# Patient Record
Sex: MALE | Race: WHITE | NOT HISPANIC OR LATINO | Employment: OTHER | URBAN - METROPOLITAN AREA
[De-identification: names, ages, dates, MRNs, and addresses within clinical notes are randomized per-mention and may not be internally consistent; named-entity substitution may affect disease eponyms.]

---

## 2018-03-01 ENCOUNTER — OFFICE VISIT CONVERTED (OUTPATIENT)
Dept: CARDIOLOGY | Facility: CLINIC | Age: 73
End: 2018-03-01
Attending: INTERNAL MEDICINE

## 2018-03-01 ENCOUNTER — CONVERSION ENCOUNTER (OUTPATIENT)
Dept: CARDIOLOGY | Facility: CLINIC | Age: 73
End: 2018-03-01

## 2018-09-06 ENCOUNTER — CONVERSION ENCOUNTER (OUTPATIENT)
Dept: CARDIOLOGY | Facility: CLINIC | Age: 73
End: 2018-09-06

## 2018-09-06 ENCOUNTER — OFFICE VISIT CONVERTED (OUTPATIENT)
Dept: CARDIOLOGY | Facility: CLINIC | Age: 73
End: 2018-09-06
Attending: INTERNAL MEDICINE

## 2019-04-25 ENCOUNTER — OFFICE VISIT CONVERTED (OUTPATIENT)
Dept: CARDIOLOGY | Facility: CLINIC | Age: 74
End: 2019-04-25
Attending: INTERNAL MEDICINE

## 2019-11-06 ENCOUNTER — OFFICE VISIT CONVERTED (OUTPATIENT)
Dept: CARDIOLOGY | Facility: CLINIC | Age: 74
End: 2019-11-06
Attending: INTERNAL MEDICINE

## 2019-11-06 ENCOUNTER — CONVERSION ENCOUNTER (OUTPATIENT)
Dept: CARDIOLOGY | Facility: CLINIC | Age: 74
End: 2019-11-06

## 2020-05-21 ENCOUNTER — TELEMEDICINE CONVERTED (OUTPATIENT)
Dept: CARDIOLOGY | Facility: CLINIC | Age: 75
End: 2020-05-21
Attending: INTERNAL MEDICINE

## 2020-12-09 ENCOUNTER — TELEPHONE CONVERTED (OUTPATIENT)
Dept: CARDIOLOGY | Facility: CLINIC | Age: 75
End: 2020-12-09
Attending: INTERNAL MEDICINE

## 2021-05-13 NOTE — PROGRESS NOTES
Quick Note      Patient Name: Amador Santana   Patient ID: [id number]   Sex: Male   YOB: 1945    Primary Care Provider: Hailey BRODERICK   Referring Provider: Hailey BRODERICK    Visit Date: May 21, 2020    Provider: Mundo Mandujano MD   Location: [facility]   Location Address: [address] [place] [address] A   [place]  [id number]   Location Phone: [phone]          History Of Present Illness  TELEHEALTH TELEPHONE VISIT  Chief Complaint: Coronary artery disease   Amador Santana is a 75-year-old male who is presenting for evaluation via telehealth telephone visit. Telehealth visit performed due to COVID-19. Verbal consent obtained before beginning visit. The patient has known coronary artery disease, previous CABG, diabetes, hypertension, paroxysmal atrial fibrillation that occurred after surgery, and aortic aneurysm. The patient has been doing well. He denies any new complaints or problems, has chronic bilateral lower extremity edema.   Provider spent 5 minutes with the patient during telehealth visit.   The following staff were present during this visit: Provider only.   Past Medical History/Overview of Patient Symptoms     PAST MEDICAL HISTORY:  Positive for coronary artery disease, previous CABG; paroxysmal atrial fibrillation; diabetes mellitus; hypertension; dyslipidemia; aortic aneurysm measuring 4.5 cm.      FAMILY HISTORY:  Positive for diabetes, hypertension, and heart disease.      PSYCHOSOCIAL HISTORY:   No history of mood change or depression. He does not drink alcohol and does not use tobacco.      CURRENT MEDICATIONS:  Lisinopril/HCTZ 20/12.5 mg b.i.d.; Metformin 1000 mg b.i.d.; Coreg 50 mg b.i.d.; Atorvastatin 80 mg daily; Glipizide 10 mg daily; Amlodipine 10 mg daily; Spironolactone 25 mg daily; Toujeo 15 mL daily; aspirin 81 mg daily; Loratadine 10 mg daily.  The dosage and frequency of the medications were reviewed with the patient.     REVIEW OF  "SYSTEMS:  Positive for swelling (feet). Negative for chest pain, palpitations, shortness of breath, chronic or frequent coughs, asthma or wheezing.       Vitals     Per patient, at-home vitals:  Blood pressure 130/87, heart rate 76, weight 321 pounds, height 5'11\".         Physical Examination     Laboratory:  Creatinine 1.8, LDL 65.           Assessment     ASSESSMENT AND PLAN:  1.  Coronary artery disease with previous CABG.  No angina.  On chronic aspirin 81 mg once a day.   2.  Hyperlipidemia, on statin.  LDL at goal.   3.  Hypertension, controlled.   4.  Aortic aneurysm. Plan on getting MRI due to chronic renal failure issues.         MD ALEJANDRA Copeland/debbie    This note was transcribed by Wandy Cates.  edbbie/alejandra  The above service was transcribed by Wandy Cates, and I attest to the accuracy of the note.  ALEJANDRA             Electronically Signed by: Katja Cates-, Other -Author on June 1, 2020 02:55:33 PM  Electronically Co-signed by: Mundo Mandujano MD -Reviewer on June 6, 2020 02:06:56 PM  "

## 2021-05-13 NOTE — PROGRESS NOTES
Progress Note      Patient Name: Amador Santana   Patient ID: [id number]   Sex: Male   YOB: 1945    Primary Care Provider: Hailey BRODERICK   Referring Provider: Hailey BRODERICK    Visit Date: December 9, 2020    Provider: Mundo Mandujano MD   Location: [facility]   Location Address: [address] [place] [address], [facility] A   [place]  [id number]   Location Phone: [phone]          Chief Complaint     Coronary artery disease.       History Of Present Illness  TELEHEALTH TELEPHONE VISIT  Amador Santana is a 75 year old /White male who is presenting for evaluation via telehealth telephone visit. Verbal consent obtained before beginning visit. The patient has CAD with CABG, diabetes, hypertension, and paroxysmal A-fib that occurred after surgery. He has been doing well. He denies chest pain, shortness of breath, and myalgias.   Provider spent 10 minutes with the patient during the telehealth visit.   The following staff were present during this visit: Provider only.   Past Medical History/ Overview of Patient Symptoms     CURRENT MEDICATIONS: Toujeo 40 units daily; Trulicity injection weekly; lisinopril-HCTZ 20-12.5 b.i.d.; Coreg 50 mg b.i.d.; atorvastatin 80 mg daily; glipizide 10 mg daily; amlodipine 10 mg daily; spironolactone 25 mg daily; aspirin 81 mg daily; loratadine 10 mg daily.     PAST MEDICAL HISTORY: Coronary artery disease, previous CABG; paroxysmal atrial fibrillation; diabetes mellitus; hypertension; dyslipidemia; aortic aneurysm measuring 4.5 cm.    ALLERGIES: No known drug allergies.       Vitals     Per patient, at-home vitals:  Blood pressure 128/78.  Heart rate 71.       Physical Examination     Laboratory: Hemoglobin 13.1.  Creatinine 1.6.  LDL 72.  ALT44.  AST 26.           Assessment     ASSESSMENT AND PLAN:  1.  Coronary artery disease, previous CABG.  No angina.  On chronic aspirin 81 mg once a day.   2.  Hyperlipidemia.  He is on a statin.  LDL nearly  below 70.  Continue with his current dosing.   3.  Hypertension.  Controlled.       Plan  · Instructions  o Plan Of Care:             Electronically Signed by: Inessa Stephens-, Other -Author on December 14, 2020 03:20:11 PM  Electronically Co-signed by: Mundo Mandujano MD -Reviewer on December 14, 2020 04:21:14 PM

## 2021-05-15 VITALS
WEIGHT: 315 LBS | DIASTOLIC BLOOD PRESSURE: 100 MMHG | BODY MASS INDEX: 45.1 KG/M2 | HEIGHT: 70 IN | SYSTOLIC BLOOD PRESSURE: 162 MMHG | HEART RATE: 64 BPM

## 2021-05-15 VITALS
WEIGHT: 315 LBS | HEART RATE: 60 BPM | BODY MASS INDEX: 45.1 KG/M2 | HEIGHT: 70 IN | DIASTOLIC BLOOD PRESSURE: 86 MMHG | SYSTOLIC BLOOD PRESSURE: 146 MMHG

## 2021-05-16 VITALS
HEIGHT: 70 IN | BODY MASS INDEX: 45.1 KG/M2 | WEIGHT: 315 LBS | HEART RATE: 64 BPM | DIASTOLIC BLOOD PRESSURE: 68 MMHG | SYSTOLIC BLOOD PRESSURE: 102 MMHG

## 2021-05-16 VITALS
DIASTOLIC BLOOD PRESSURE: 78 MMHG | SYSTOLIC BLOOD PRESSURE: 118 MMHG | HEART RATE: 64 BPM | HEIGHT: 70 IN | WEIGHT: 315 LBS | BODY MASS INDEX: 45.1 KG/M2

## 2021-07-28 PROBLEM — E78.5 HYPERLIPIDEMIA LDL GOAL <70: Status: ACTIVE | Noted: 2021-07-28

## 2021-07-28 PROBLEM — I10 ESSENTIAL HYPERTENSION: Status: ACTIVE | Noted: 2021-07-28

## 2021-07-28 PROBLEM — I71.20 THORACIC AORTIC ANEURYSM WITHOUT RUPTURE (HCC): Status: ACTIVE | Noted: 2021-07-28

## 2021-07-28 PROBLEM — I48.0 PAROXYSMAL ATRIAL FIBRILLATION (HCC): Status: ACTIVE | Noted: 2021-07-28

## 2021-07-28 PROBLEM — I25.10 CORONARY ARTERY DISEASE INVOLVING NATIVE CORONARY ARTERY OF NATIVE HEART WITHOUT ANGINA PECTORIS: Status: ACTIVE | Noted: 2021-07-28

## 2021-07-28 NOTE — PROGRESS NOTES
Chief Complaint  Coronary Artery Disease, Previous CABG, Hypertension, and Atrial Fibrillation    Subjective    With increasing lower extremity edema over the summer.  10 pound weight gain since his last visit the patient has not had any increasing shortness of breath PND orthopnea symptoms.  Patient is noticed an increase in edema symptoms since darted on Lasix about 3 months ago    Past Medical History:   Diagnosis Date   • Acute renal failure superimposed on stage 3b chronic kidney disease (CMS/[facility]) 7/29/2021   • Aneurysm (CMS/[facility])    • CAD with CABG 7/28/2021   • Chronic kidney disease    • Diabetes mellitus (CMS/[facility])    • Essential hypertension 7/28/2021   • Hyperlipidemia    • Paroxysmal atrial fibrillation (CMS/[facility]) 7/28/2021      Positive for coronary artery disease, previous CABG, paroxysmal atrial fibrillation, diabetes, hypertension, dyslipidemia and aortic aneurysm measuring 4.5 cm.       Current Outpatient Medications:   •  amLODIPine (NORVASC) 10 MG tablet, Take 10 mg by mouth Daily., Disp: , Rfl:   •  atorvastatin (LIPITOR) 80 MG tablet, Take 80 mg by mouth every night at bedtime., Disp: , Rfl:   •  carvedilol (COREG) 25 MG tablet, Take 50 mg by mouth 2 (Two) Times a Day., Disp: , Rfl:   •  furosemide (LASIX) 20 MG tablet, , Disp: , Rfl:   •  glipizide (GLUCOTROL) 10 MG tablet, Take 10 mg by mouth 2 (Two) Times a Day., Disp: , Rfl:   •  lisinopril-hydrochlorothiazide (PRINZIDE,ZESTORETIC) 20-12.5 MG per tablet, Take 2 tablets by mouth Daily., Disp: , Rfl:   •  loratadine (CLARITIN) 10 MG tablet, Take 10 mg by mouth Daily., Disp: , Rfl:   •  spironolactone (ALDACTONE) 25 MG tablet, Take 25 mg by mouth Daily., Disp: , Rfl:   •  Toujeo SoloStar 300 UNIT/ML solution pen-injector injection, 60 Units., Disp: , Rfl:   •  Trulicity 1.5 MG/0.5ML solution pen-injector, INJECT 0.5ML (1.5MG) SUBCUTANEOUSLY EVERY 7 DAYS IN THE ABDOMEN THIGH OR UPPER ARM ROTATING INJECTION SITES, Disp: , Rfl:     There are no  "discontinued medications.  No Known Allergies     Social History     Tobacco Use   • Smoking status: Never Smoker   • Smokeless tobacco: Never Used   Vaping Use   • Vaping Use: Never used   Substance Use Topics   • Alcohol use: Never   • Drug use: Never       Family History   Problem Relation Age of Onset   • No Known Problems Mother    • Heart attack Father    • Heart failure Father    • Hypertension Father         Objective     /70   Pulse 68   Ht 177.8 cm (70\")   Wt (!) 152 kg (336 lb)   BMI 48.21 kg/m²       Physical Exam    General Appearance:   · no acute distress  · Alert and oriented x3  HENT:   · lips not cyanotic  · Atraumatic  Neck:  · No jvd   · supple  Respiratory:  · no respiratory distress  · normal breath sounds  · no rales  Cardiovascular:  · Regular irregular   · no S3, no S4   · no murmur  · no rub  Extremities  · No cyanosis  · lower extremity edema: +1-2  Skin:   · warm, dry  · No rashes      Result Review :     No results found for: PROBNP       No results found for: TSH   No results found for: FREET4   No results found for: DDIMERQUANT  No results found for: MG   No results found for: DIGOXIN   No results found for: TROPONINT          No results found for: POCTROP  Labs 7/21   Hemoglobin was 12.5  Hemoglobin A1c was 10.7  Creatinine was 2.0  Potassium was 5.3  Magnesium was 2.1              Diagnoses and all orders for this visit:    1. Acute on chronic heart failure with preserved ejection fraction (CMS/HCC) (Primary)  Assessment & Plan:  Patient with evidence of increasing volume overload and lower extremity edema suspect underlying diastolic congestive heart failure as a contributing cause.  Improved with Lasix 20 a day for the patient to keep a home weight log and counseled on low-sodium diet.  Will check echocardiogram and proBNP level as well.  Also strongly urged him to get work-up for obstructive sleep apnea      2. CAD with CABG    3. Paroxysmal atrial fibrillation " (CMS/[facility])    4. Essential hypertension  Assessment & Plan:  Controlled continue with current dose of lisinopril hydrochlorothiazide, amlodipine, and Coreg      5. Thoracic aortic aneurysm without rupture (CMS/HCC)    6. Hyperlipidemia LDL goal <70    7. Dyspnea on exertion  -     Adult Transthoracic Echo Complete W/ Cont if Necessary Per Protocol; Future  -     BNP; Future    8. Morbid obesity with BMI of 45.0-49.9, adult (CMS/HCC)    9. Acute renal failure superimposed on stage 3b chronic kidney disease, unspecified acute renal failure type (CMS/[facility])          Follow Up     Return in about 6 months (around 1/29/2022).    Patient was given instructions and counseling regarding his condition or for health maintenance advice. Please see specific information pulled into the AVS if appropriate.

## 2021-07-29 ENCOUNTER — OFFICE VISIT (OUTPATIENT)
Dept: CARDIOLOGY | Facility: CLINIC | Age: 76
End: 2021-07-29

## 2021-07-29 VITALS
DIASTOLIC BLOOD PRESSURE: 70 MMHG | SYSTOLIC BLOOD PRESSURE: 128 MMHG | HEIGHT: 70 IN | WEIGHT: 315 LBS | BODY MASS INDEX: 45.1 KG/M2 | HEART RATE: 68 BPM

## 2021-07-29 DIAGNOSIS — R06.09 DYSPNEA ON EXERTION: ICD-10-CM

## 2021-07-29 DIAGNOSIS — I50.33 ACUTE ON CHRONIC HEART FAILURE WITH PRESERVED EJECTION FRACTION (HCC): Primary | ICD-10-CM

## 2021-07-29 DIAGNOSIS — N17.9 ACUTE RENAL FAILURE SUPERIMPOSED ON STAGE 3B CHRONIC KIDNEY DISEASE, UNSPECIFIED ACUTE RENAL FAILURE TYPE (HCC): ICD-10-CM

## 2021-07-29 DIAGNOSIS — I25.10 CORONARY ARTERY DISEASE INVOLVING NATIVE CORONARY ARTERY OF NATIVE HEART WITHOUT ANGINA PECTORIS: ICD-10-CM

## 2021-07-29 DIAGNOSIS — I10 ESSENTIAL HYPERTENSION: ICD-10-CM

## 2021-07-29 DIAGNOSIS — E78.5 HYPERLIPIDEMIA LDL GOAL <70: ICD-10-CM

## 2021-07-29 DIAGNOSIS — E66.01 MORBID OBESITY WITH BMI OF 45.0-49.9, ADULT (HCC): ICD-10-CM

## 2021-07-29 DIAGNOSIS — I71.20 THORACIC AORTIC ANEURYSM WITHOUT RUPTURE (HCC): ICD-10-CM

## 2021-07-29 DIAGNOSIS — N18.32 ACUTE RENAL FAILURE SUPERIMPOSED ON STAGE 3B CHRONIC KIDNEY DISEASE, UNSPECIFIED ACUTE RENAL FAILURE TYPE (HCC): ICD-10-CM

## 2021-07-29 DIAGNOSIS — I48.0 PAROXYSMAL ATRIAL FIBRILLATION (HCC): ICD-10-CM

## 2021-07-29 PROCEDURE — 99214 OFFICE O/P EST MOD 30 MIN: CPT | Performed by: INTERNAL MEDICINE

## 2021-07-29 RX ORDER — LISINOPRIL AND HYDROCHLOROTHIAZIDE 20; 12.5 MG/1; MG/1
2 TABLET ORAL DAILY
COMMUNITY
Start: 2021-07-22

## 2021-07-29 RX ORDER — ATORVASTATIN CALCIUM 80 MG/1
80 TABLET, FILM COATED ORAL
COMMUNITY
Start: 2021-07-22

## 2021-07-29 RX ORDER — CARVEDILOL 25 MG/1
50 TABLET ORAL 2 TIMES DAILY
COMMUNITY
Start: 2021-07-22

## 2021-07-29 RX ORDER — AMLODIPINE BESYLATE 10 MG/1
10 TABLET ORAL DAILY
COMMUNITY
Start: 2021-07-22 | End: 2022-09-14

## 2021-07-29 RX ORDER — SPIRONOLACTONE 25 MG/1
25 TABLET ORAL DAILY
COMMUNITY
Start: 2021-07-22

## 2021-07-29 RX ORDER — INSULIN GLARGINE 300 U/ML
60 INJECTION, SOLUTION SUBCUTANEOUS
COMMUNITY
Start: 2021-07-19

## 2021-07-29 RX ORDER — LORATADINE 10 MG/1
10 TABLET ORAL DAILY
COMMUNITY
Start: 2021-07-22

## 2021-07-29 RX ORDER — GLIPIZIDE 10 MG/1
10 TABLET ORAL 2 TIMES DAILY
COMMUNITY
Start: 2021-07-22

## 2021-07-29 RX ORDER — FUROSEMIDE 20 MG/1
40 TABLET ORAL DAILY
COMMUNITY
Start: 2021-05-20

## 2021-07-29 RX ORDER — DULAGLUTIDE 1.5 MG/.5ML
INJECTION, SOLUTION SUBCUTANEOUS
COMMUNITY
Start: 2021-07-19

## 2021-07-29 NOTE — PATIENT INSTRUCTIONS
"Low-Sodium Eating Plan  Sodium, which is an element that makes up salt, helps you maintain a healthy balance of fluids in your body. Too much sodium can increase your blood pressure and cause fluid and waste to be held in your body.  Your health care provider or dietitian may recommend following this plan if you have high blood pressure (hypertension), kidney disease, liver disease, or heart failure. Eating less sodium can help lower your blood pressure, reduce swelling, and protect your heart, liver, and kidneys.  What are tips for following this plan?  Reading food labels  · The Nutrition Facts label lists the amount of sodium in one serving of the food. If you eat more than one serving, you must multiply the listed amount of sodium by the number of servings.  · Choose foods with less than 140 mg of sodium per serving.  · Avoid foods with 300 mg of sodium or more per serving.  Shopping    · Look for lower-sodium products, often labeled as \"low-sodium\" or \"no salt added.\"  · Always check the sodium content, even if foods are labeled as \"unsalted\" or \"no salt added.\"  · Buy fresh foods.  ? Avoid canned foods and pre-made or frozen meals.  ? Avoid canned, cured, or processed meats.  · Buy breads that have less than 80 mg of sodium per slice.  Cooking    · Eat more home-cooked food and less restaurant, buffet, and fast food.  · Avoid adding salt when cooking. Use salt-free seasonings or herbs instead of table salt or sea salt. Check with your health care provider or pharmacist before using salt substitutes.  · Cook with plant-based oils, such as canola, sunflower, or olive oil.  Meal planning  · When eating at a restaurant, ask that your food be prepared with less salt or no salt, if possible. Avoid dishes labeled as brined, pickled, cured, smoked, or made with soy sauce, miso, or teriyaki sauce.  · Avoid foods that contain MSG (monosodium glutamate). MSG is sometimes added to Chinese food, bouillon, and some canned " "foods.  · Make meals that can be grilled, baked, poached, roasted, or steamed. These are generally made with less sodium.  General information  Most people on this plan should limit their sodium intake to 1,500-2,000 mg (milligrams) of sodium each day.  What foods should I eat?  Fruits  Fresh, frozen, or canned fruit. Fruit juice.  Vegetables  Fresh or frozen vegetables. \"No salt added\" canned vegetables. \"No salt added\" tomato sauce and paste. Low-sodium or reduced-sodium tomato and vegetable juice.  Grains  Low-sodium cereals, including oats, puffed wheat and rice, and shredded wheat. Low-sodium crackers. Unsalted rice. Unsalted pasta. Low-sodium bread. Whole-grain breads and whole-grain pasta.  Meats and other proteins  Fresh or frozen (no salt added) meat, poultry, seafood, and fish. Low-sodium canned tuna and salmon. Unsalted nuts. Dried peas, beans, and lentils without added salt. Unsalted canned beans. Eggs. Unsalted nut butters.  Dairy  Milk. Soy milk. Cheese that is naturally low in sodium, such as ricotta cheese, fresh mozzarella, or Swiss cheese. Low-sodium or reduced-sodium cheese. Cream cheese. Yogurt.  Seasonings and condiments  Fresh and dried herbs and spices. Salt-free seasonings. Low-sodium mustard and ketchup. Sodium-free salad dressing. Sodium-free light mayonnaise. Fresh or refrigerated horseradish. Lemon juice. Vinegar.  Other foods  Homemade, reduced-sodium, or low-sodium soups. Unsalted popcorn and pretzels. Low-salt or salt-free chips.  The items listed above may not be a complete list of foods and beverages you can eat. Contact a dietitian for more information.  What foods should I avoid?  Vegetables  Sauerkraut, pickled vegetables, and relishes. Olives. French fries. Onion rings. Regular canned vegetables (not low-sodium or reduced-sodium). Regular canned tomato sauce and paste (not low-sodium or reduced-sodium). Regular tomato and vegetable juice (not low-sodium or reduced-sodium). Frozen " vegetables in sauces.  Grains  Instant hot cereals. Bread stuffing, pancake, and biscuit mixes. Croutons. Seasoned rice or pasta mixes. Noodle soup cups. Boxed or frozen macaroni and cheese. Regular salted crackers. Self-rising flour.  Meats and other proteins  Meat or fish that is salted, canned, smoked, spiced, or pickled. Precooked or cured meat, such as sausages or meat loaves. Gentile. Ham. Pepperoni. Hot dogs. Corned beef. Chipped beef. Salt pork. Jerky. Pickled herring. Anchovies and sardines. Regular canned tuna. Salted nuts.  Dairy  Processed cheese and cheese spreads. Hard cheeses. Cheese curds. Blue cheese. Feta cheese. String cheese. Regular cottage cheese. Buttermilk. Canned milk.  Fats and oils  Salted butter. Regular margarine. Ghee. Gentile fat.  Seasonings and condiments  Onion salt, garlic salt, seasoned salt, table salt, and sea salt. Canned and packaged gravies. Worcestershire sauce. Tartar sauce. Barbecue sauce. Teriyaki sauce. Soy sauce, including reduced-sodium. Steak sauce. Fish sauce. Oyster sauce. Cocktail sauce. Horseradish that you find on the shelf. Regular ketchup and mustard. Meat flavorings and tenderizers. Bouillon cubes. Hot sauce. Pre-made or packaged marinades. Pre-made or packaged taco seasonings. Relishes. Regular salad dressings. Salsa.  Other foods  Salted popcorn and pretzels. Corn chips and puffs. Potato and tortilla chips. Canned or dried soups. Pizza. Frozen entrees and pot pies.  The items listed above may not be a complete list of foods and beverages you should avoid. Contact a dietitian for more information.  Summary  · Eating less sodium can help lower your blood pressure, reduce swelling, and protect your heart, liver, and kidneys.  · Most people on this plan should limit their sodium intake to 1,500-2,000 mg (milligrams) of sodium each day.  · Canned, boxed, and frozen foods are high in sodium. Restaurant foods, fast foods, and pizza are also very high in sodium. You  also get sodium by adding salt to food.  · Try to cook at home, eat more fresh fruits and vegetables, and eat less fast food and canned, processed, or prepared foods.  This information is not intended to replace advice given to you by your health care provider. Make sure you discuss any questions you have with your health care provider.  Document Revised: 01/22/2021 Document Reviewed: 11/18/2020  [employer] [place] Patient Education © 2021 [employer]

## 2021-07-29 NOTE — ASSESSMENT & PLAN NOTE
Patient with evidence of increasing volume overload and lower extremity edema suspect underlying diastolic congestive heart failure as a contributing cause.  Improved with Lasix 20 a day for the patient to keep a home weight log and counseled on low-sodium diet.  Will check echocardiogram and proBNP level as well.  Also strongly urged him to get work-up for obstructive sleep apnea

## 2021-08-02 DIAGNOSIS — R06.09 DYSPNEA ON EXERTION: ICD-10-CM

## 2021-08-23 ENCOUNTER — TELEPHONE (OUTPATIENT)
Dept: CARDIOLOGY | Facility: CLINIC | Age: 76
End: 2021-08-23

## 2021-08-23 NOTE — TELEPHONE ENCOUNTER
----- Message from DAVIE Rosen sent at 8/23/2021  3:53 PM EDT -----  Notify pt echo result:  Estimated left ventricular EF = 55% Left ventricular systolic function is normal. No valvular disease noted. Keep February follow up

## 2022-03-09 PROBLEM — R06.09 DYSPNEA ON EXERTION: Status: RESOLVED | Noted: 2021-07-29 | Resolved: 2022-03-09

## 2022-03-09 PROBLEM — I50.32 CHRONIC HEART FAILURE WITH PRESERVED EJECTION FRACTION: Status: ACTIVE | Noted: 2021-07-29

## 2022-03-10 ENCOUNTER — OFFICE VISIT (OUTPATIENT)
Dept: CARDIOLOGY | Facility: CLINIC | Age: 77
End: 2022-03-10

## 2022-03-10 VITALS
WEIGHT: 315 LBS | HEIGHT: 70 IN | HEART RATE: 74 BPM | SYSTOLIC BLOOD PRESSURE: 113 MMHG | DIASTOLIC BLOOD PRESSURE: 62 MMHG | BODY MASS INDEX: 45.1 KG/M2 | OXYGEN SATURATION: 97 %

## 2022-03-10 DIAGNOSIS — I10 ESSENTIAL HYPERTENSION: ICD-10-CM

## 2022-03-10 DIAGNOSIS — I50.32 CHRONIC HEART FAILURE WITH PRESERVED EJECTION FRACTION: ICD-10-CM

## 2022-03-10 DIAGNOSIS — I48.0 PAROXYSMAL ATRIAL FIBRILLATION: ICD-10-CM

## 2022-03-10 DIAGNOSIS — E78.5 HYPERLIPIDEMIA LDL GOAL <70: ICD-10-CM

## 2022-03-10 DIAGNOSIS — I25.10 CORONARY ARTERY DISEASE INVOLVING NATIVE CORONARY ARTERY OF NATIVE HEART WITHOUT ANGINA PECTORIS: Primary | ICD-10-CM

## 2022-03-10 DIAGNOSIS — I71.20 THORACIC AORTIC ANEURYSM WITHOUT RUPTURE: ICD-10-CM

## 2022-03-10 PROCEDURE — 99214 OFFICE O/P EST MOD 30 MIN: CPT | Performed by: NURSE PRACTITIONER

## 2022-03-10 RX ORDER — ASPIRIN 81 MG/1
81 TABLET, CHEWABLE ORAL DAILY
COMMUNITY

## 2022-03-10 RX ORDER — FLURBIPROFEN SODIUM 0.3 MG/ML
SOLUTION/ DROPS OPHTHALMIC
COMMUNITY
Start: 2022-01-27

## 2022-03-10 NOTE — PROGRESS NOTES
Chief Complaint  Atrial Fibrillation, Hypertension, Hyperlipidemia, Follow-up, and Shortness of Breath    Subjective            History of Present Illness  Amador Santana is a 76-year-old white/ male patient who presents to the office today for follow-up.  He has CAD status post CABG, chronic HFpEF, paroxysmal atrial fibrillation postoperatively, hypertension, hyperlipidemia, and thoracic aortic aneurysm.  He reports compliance with all of his medications.  He reports swelling in his bilateral lower extremities that is worse by the end of the day but improved by the next morning.  He denies any chest pain, shortness of breath, lightheadedness/dizziness, or palpitations.    PMH  Past Medical History:   Diagnosis Date   • Acute renal failure superimposed on stage 3b chronic kidney disease (HCC) 07/29/2021   • CAD with CABG 07/28/2021   • Chronic heart failure with preserved ejection fraction 07/29/2021   • Chronic kidney disease    • Diabetes mellitus (HCC)    • Essential hypertension 07/28/2021   • Hyperlipidemia    • Paroxysmal atrial fibrillation 07/28/2021    post op   • Thoracic aortic aneurysm without rupture 07/28/2021    4.5 cm by CT on 6/20         ALLERGY  No Known Allergies       SURGICALHX  Past Surgical History:   Procedure Laterality Date   • CORONARY ARTERY BYPASS GRAFT     • HERNIA REPAIR            SOC  Social History     Socioeconomic History   • Marital status:    Tobacco Use   • Smoking status: Never Smoker   • Smokeless tobacco: Never Used   Vaping Use   • Vaping Use: Never used   Substance and Sexual Activity   • Alcohol use: Never   • Drug use: Never   • Sexual activity: Defer         FAMHX  Family History   Problem Relation Age of Onset   • No Known Problems Mother    • Heart attack Father    • Heart failure Father    • Hypertension Father           MEDSIGONLY  Current Outpatient Medications on File Prior to Visit   Medication Sig   • amLODIPine (NORVASC) 10 MG tablet Take 10 mg  "by mouth Daily.   • aspirin 81 MG chewable tablet Chew 81 mg Daily.   • atorvastatin (LIPITOR) 80 MG tablet Take 80 mg by mouth every night at bedtime.   • B-D UF III MINI PEN NEEDLES 31G X 5 MM misc USE 1 ONCE DAILY   • carvedilol (COREG) 25 MG tablet Take 50 mg by mouth 2 (Two) Times a Day.   • furosemide (LASIX) 20 MG tablet    • glipizide (GLUCOTROL) 10 MG tablet Take 10 mg by mouth 2 (Two) Times a Day.   • lisinopril-hydrochlorothiazide (PRINZIDE,ZESTORETIC) 20-12.5 MG per tablet Take 2 tablets by mouth Daily.   • loratadine (CLARITIN) 10 MG tablet Take 10 mg by mouth Daily.   • spironolactone (ALDACTONE) 25 MG tablet Take 25 mg by mouth Daily.   • Toujeo SoloStar 300 UNIT/ML solution pen-injector injection 60 Units.   • Trulicity 1.5 MG/0.5ML solution pen-injector INJECT 0.5ML (1.5MG) SUBCUTANEOUSLY EVERY 7 DAYS IN THE ABDOMEN THIGH OR UPPER ARM ROTATING INJECTION SITES     No current facility-administered medications on file prior to visit.         Objective   /62 (BP Location: Right arm, Patient Position: Sitting, Cuff Size: Adult)   Pulse 74   Ht 177.8 cm (70\")   Wt (!) 149 kg (328 lb 9.6 oz)   SpO2 97%   BMI 47.15 kg/m²       Physical Exam  Constitutional:       Appearance: He is obese.   HENT:      Head: Normocephalic.   Neck:      Vascular: No carotid bruit.   Cardiovascular:      Rate and Rhythm: Normal rate and regular rhythm.      Pulses: Normal pulses.      Heart sounds: Normal heart sounds. No murmur heard.  Pulmonary:      Effort: Pulmonary effort is normal.      Breath sounds: Normal breath sounds.   Musculoskeletal:      Cervical back: Neck supple.      Right lower le+ Edema present.      Left lower le+ Edema present.   Skin:     General: Skin is dry.      Capillary Refill: Capillary refill takes less than 2 seconds.   Neurological:      Mental Status: He is alert and oriented to person, place, and time.   Psychiatric:         Behavior: Behavior normal.       Result Review : "   The following data was reviewed by: DAVIE Silverio on 03/10/2022:  No results found for: PROBNP  7/22/2021 CMP  BUN 47  Creatinine 2.0  Sodium 134  Potassium 5.3  ALT 11  AST 14  Alkaline phosphatase 78  Total cholesterol 129  Triglycerides 181  HDL 27  LDL 66  Magnesium 2.1  Requesting more recent labs from PCP  No results found for: TSH   No results found for: FREET4   No results found for: DDIMERQUANT  No results found for: MG   No results found for: DIGOXIN   No results found for: TROPONINT          Results for orders placed in visit on 08/19/21    Adult Transthoracic Echo Complete W/ Cont if Necessary Per Protocol    Interpretation Summary  · Technically difficult images  · Estimated left ventricular EF = 55% Left ventricular systolic function is normal.       Assessment and Plan    Diagnoses and all orders for this visit:    1. CAD with CABG (Primary)  Currently denies any anginal symptoms, continue aspirin 81 mg daily.    2. Chronic heart failure with preserved ejection fraction  Chronic bilateral lower extremity edema, denies shortness of breath, continue carvedilol 50 mg twice daily, Lasix 20 mg daily, lisinopril HCTZ 40-25 mg daily, and spironolactone 25 mg daily.    3. Paroxysmal atrial fibrillation, post operatively  Without any recurrence of atrial fibrillation, continue carvedilol 50 mg twice daily.  Continue aspirin 81 mg daily for CVA prevention.    4. Essential hypertension  Currently controlled and without adverse effects from medication, continue amlodipine 10 mg daily, carvedilol 50 mg twice daily, and lisinopril HCTZ 40-25 mg daily.    5. Hyperlipidemia LDL goal <70  Last lipid panel that I can review was done on 7/22/2021 with LDL 66 which is within his goal range, will request more recent lipid panel that was done in January, for now continue atorvastatin 80 mg nightly.    6. TAA  Last CT of chest was performed on 6/2/2020 which showed stable 4.5 cm ascending thoracic aortic aneurysm.   Repeat CT, will call with results once available to me  -     CT Chest Without Contrast; Future        Follow Up {Instructions Charge Capture  Follow-up Communications :23}  Return in about 6 months (around 9/10/2022) for Follow up with Dr Mandujano.    Patient was given instructions and counseling regarding his condition or for health maintenance advice. Please see specific information pulled into the AVS if appropriate.     Amador Santana  reports that he has never smoked. He has never used smokeless tobacco.           Yessi Sahu, DAVIE  03/10/22  10:29 EST    Dictated Utilizing Dragon Dictation

## 2022-03-23 ENCOUNTER — TELEPHONE (OUTPATIENT)
Dept: CARDIOLOGY | Facility: CLINIC | Age: 77
End: 2022-03-23

## 2022-03-23 NOTE — TELEPHONE ENCOUNTER
----- Message from DAVIE Rosen sent at 3/22/2022 11:49 AM EDT -----  Notify pt CT Chest shows unchanged thoracic aneurysm, 4.5 cm, stable. Repeat scan in 1 year

## 2022-05-26 ENCOUNTER — OFFICE VISIT (OUTPATIENT)
Dept: CARDIOLOGY | Facility: CLINIC | Age: 77
End: 2022-05-26

## 2022-05-26 VITALS
HEART RATE: 104 BPM | DIASTOLIC BLOOD PRESSURE: 68 MMHG | HEIGHT: 70 IN | SYSTOLIC BLOOD PRESSURE: 122 MMHG | WEIGHT: 315 LBS | BODY MASS INDEX: 45.1 KG/M2

## 2022-05-26 DIAGNOSIS — I48.0 PAROXYSMAL ATRIAL FIBRILLATION: Primary | ICD-10-CM

## 2022-05-26 PROCEDURE — 93000 ELECTROCARDIOGRAM COMPLETE: CPT | Performed by: NURSE PRACTITIONER

## 2022-05-26 PROCEDURE — 99213 OFFICE O/P EST LOW 20 MIN: CPT | Performed by: NURSE PRACTITIONER

## 2022-05-26 RX ORDER — DILTIAZEM HYDROCHLORIDE 120 MG/1
120 CAPSULE, COATED, EXTENDED RELEASE ORAL DAILY
Qty: 90 CAPSULE | Refills: 3 | Status: SHIPPED | OUTPATIENT
Start: 2022-05-26 | End: 2023-04-05 | Stop reason: SDUPTHER

## 2022-05-26 NOTE — PROGRESS NOTES
"Chief Complaint  Atrial Fibrillation, Hypertension, Hyperlipidemia, and Irregular Heart Beat    Subjective            History of Present Illness  Amador Santana is a 77-year-old white/ male patient who presents to the office today with complaint of \"irregular heart beat\". He has paroxysmal atrial fibrillation but only experienced it briefly post operatively in the past. For that reason he had not been prescribed any anticoagulation. Today he reports that he was at another doctor appointment on 5/13/22 with Dr Olmos and she heard an \"irregular\" rhythm on exam. She instructed the patient to be seen by cardiology for further evaluation. He denies any symptoms of chest pain, shortness of breath, lightheadedness/dizziness, palpitations, or edema.     PMH  Past Medical History:   Diagnosis Date   • Acute renal failure superimposed on stage 3b chronic kidney disease (HCC) 07/29/2021   • CAD with CABG 07/28/2021   • Chronic heart failure with preserved ejection fraction 07/29/2021   • Chronic kidney disease    • Diabetes mellitus (HCC)    • Essential hypertension 07/28/2021   • Hyperlipidemia    • Paroxysmal atrial fibrillation 07/28/2021    post op   • Thoracic aortic aneurysm without rupture 07/28/2021    4.5 cm by CT on 6/20         ALLERGY  No Known Allergies       SURGICALHX  Past Surgical History:   Procedure Laterality Date   • CORONARY ARTERY BYPASS GRAFT     • HERNIA REPAIR            SOC  Social History     Socioeconomic History   • Marital status:    Tobacco Use   • Smoking status: Never Smoker   • Smokeless tobacco: Never Used   Vaping Use   • Vaping Use: Never used   Substance and Sexual Activity   • Alcohol use: Never   • Drug use: Never   • Sexual activity: Defer         FAMHX  Family History   Problem Relation Age of Onset   • No Known Problems Mother    • Heart attack Father    • Heart failure Father    • Hypertension Father           MEDSIGONLY  Current Outpatient Medications on File Prior " "to Visit   Medication Sig   • amLODIPine (NORVASC) 10 MG tablet Take 10 mg by mouth Daily.   • aspirin 81 MG chewable tablet Chew 81 mg Daily.   • atorvastatin (LIPITOR) 80 MG tablet Take 80 mg by mouth every night at bedtime.   • B-D UF III MINI PEN NEEDLES 31G X 5 MM misc USE 1 ONCE DAILY   • carvedilol (COREG) 25 MG tablet Take 50 mg by mouth 2 (Two) Times a Day.   • furosemide (LASIX) 20 MG tablet Take 20 mg by mouth 2 (Two) Times a Day.   • glipizide (GLUCOTROL) 10 MG tablet Take 10 mg by mouth 2 (Two) Times a Day.   • lisinopril-hydrochlorothiazide (PRINZIDE,ZESTORETIC) 20-12.5 MG per tablet Take 2 tablets by mouth Daily.   • loratadine (CLARITIN) 10 MG tablet Take 10 mg by mouth Daily.   • spironolactone (ALDACTONE) 25 MG tablet Take 25 mg by mouth Daily.   • Toujeo SoloStar 300 UNIT/ML solution pen-injector injection 60 Units.   • Trulicity 1.5 MG/0.5ML solution pen-injector INJECT 0.5ML (1.5MG) SUBCUTANEOUSLY EVERY 7 DAYS IN THE ABDOMEN THIGH OR UPPER ARM ROTATING INJECTION SITES     No current facility-administered medications on file prior to visit.         Objective   /68   Pulse 104   Ht 177.8 cm (70\")   Wt (!) 153 kg (337 lb)   BMI 48.35 kg/m²       Physical Exam  Constitutional:       Appearance: He is obese.   HENT:      Head: Normocephalic.   Neck:      Vascular: No carotid bruit.   Cardiovascular:      Rate and Rhythm: Normal rate. Rhythm irregular.      Pulses: Normal pulses.      Heart sounds: Normal heart sounds. No murmur heard.  Pulmonary:      Effort: Pulmonary effort is normal.      Breath sounds: Normal breath sounds.   Musculoskeletal:      Cervical back: Neck supple.      Right lower leg: No edema.      Left lower leg: No edema.   Skin:     General: Skin is dry.      Capillary Refill: Capillary refill takes less than 2 seconds.   Neurological:      Mental Status: He is alert and oriented to person, place, and time.   Psychiatric:         Behavior: Behavior normal.       ECG 12 " Lead    Date/Time: 5/26/2022 1:44 PM  Performed by: Yessi Sahu APRN  Authorized by: Mundo Mandujano MD   Comparison: compared with previous ECG   Comparison to previous ECG: Atrial fibrillation  Rhythm: atrial fibrillation  Ectopy: unifocal PVCs  Rate: normal  BPM: 90  Conduction: conduction normal  ST Segments: ST segments normal  T Waves: T waves normal  Other: no other findings    Clinical impression: abnormal EKG          Result Review :   The following data was reviewed by: DAVIE Silverio on 05/26/2022:  No results found for: PROBNP     No results found for: TSH   No results found for: FREET4   No results found for: DDIMERQUANT  No results found for: MG   No results found for: DIGOXIN   No results found for: TROPONINT          Results for orders placed in visit on 08/19/21    Adult Transthoracic Echo Complete W/ Cont if Necessary Per Protocol    Interpretation Summary  · Technically difficult images  · Estimated left ventricular EF = 55% Left ventricular systolic function is normal.       Assessment and Plan    Diagnoses and all orders for this visit:    1. Paroxysmal atrial fibrillation (Primary)  EKG in office today shows atrial fibrillation with rate of 90 bpm.   Patient is asymptomatic.   Start diltiazem 120 mg daily for rhythm control. Start eliquis 5 mg BID for CVA prevention. Continue carvedilol 50 mg BID for rate control. Have patient come back in 4 weeks for EKG only visit to evaluate rhythm and rate.  -     ECG 12 Lead; Future  -     Basic Metabolic Panel; Future    Other orders  -     apixaban (ELIQUIS) 5 MG tablet tablet; Take 1 tablet by mouth 2 (Two) Times a Day.  Dispense: 180 tablet; Refill: 1  -     dilTIAZem CD (CARDIZEM CD) 120 MG 24 hr capsule; Take 1 capsule by mouth Daily.  Dispense: 90 capsule; Refill: 3  -     apixaban (ELIQUIS) 5 MG tablet tablet; Take 1 tablet by mouth 2 (Two) Times a Day.  Dispense: 42 tablet; Refill: 0  -     ECG 12 Lead            Follow Up   Return  for Follow up with Dr Mandujano in September as scheduled.    Patient was given instructions and counseling regarding his condition or for health maintenance advice. Please see specific information pulled into the AVS if appropriate.     Amador HAILEY Santana  reports that he has never smoked. He has never used smokeless tobacco.           Yessi Sahu, DAVIE  05/26/22  09:13 EDT    Dictated Utilizing Dragon Dictation

## 2022-06-21 ENCOUNTER — TELEPHONE (OUTPATIENT)
Dept: CARDIOLOGY | Facility: CLINIC | Age: 77
End: 2022-06-21

## 2022-06-21 DIAGNOSIS — N17.9 ACUTE RENAL FAILURE SUPERIMPOSED ON STAGE 3B CHRONIC KIDNEY DISEASE, UNSPECIFIED ACUTE RENAL FAILURE TYPE: Primary | ICD-10-CM

## 2022-06-21 DIAGNOSIS — N18.32 ACUTE RENAL FAILURE SUPERIMPOSED ON STAGE 3B CHRONIC KIDNEY DISEASE, UNSPECIFIED ACUTE RENAL FAILURE TYPE: Primary | ICD-10-CM

## 2022-06-21 DIAGNOSIS — I48.0 PAROXYSMAL ATRIAL FIBRILLATION: ICD-10-CM

## 2022-06-21 NOTE — TELEPHONE ENCOUNTER
----- Message from DAVIE Rosen sent at 6/21/2022 10:55 AM EDT -----  Notify pt his eliquis dose needs to be decreased to 2.5 mg twice daily due to current renal function.  Repeat BMP in 2 weeks

## 2022-06-21 NOTE — TELEPHONE ENCOUNTER
GIRISH patient wife. Voiced understanding. Patient will  lab order when comes for his appointment on Friday

## 2022-06-24 ENCOUNTER — OFFICE VISIT (OUTPATIENT)
Dept: CARDIOLOGY | Facility: CLINIC | Age: 77
End: 2022-06-24

## 2022-06-24 DIAGNOSIS — I48.0 PAROXYSMAL ATRIAL FIBRILLATION: Primary | ICD-10-CM

## 2022-06-24 PROCEDURE — 93000 ELECTROCARDIOGRAM COMPLETE: CPT | Performed by: NURSE PRACTITIONER

## 2022-06-29 NOTE — PROGRESS NOTES
Procedure     ECG 12 Lead    Date/Time: 6/24/2022 9:40 AM  Performed by: Yessi Sahu APRN  Authorized by: Omar Lopez MD   Comparison: compared with previous ECG   Similar to previous ECG  Rhythm: atrial fibrillation  Rate: normal  BPM: 81  Conduction: conduction normal  ST Segments: ST segments normal  T Waves: T waves normal  QRS axis: normal  Other findings: early repolarization    Clinical impression: abnormal EKG

## 2022-09-14 ENCOUNTER — OFFICE VISIT (OUTPATIENT)
Dept: CARDIOLOGY | Facility: CLINIC | Age: 77
End: 2022-09-14

## 2022-09-14 VITALS
DIASTOLIC BLOOD PRESSURE: 92 MMHG | BODY MASS INDEX: 45.1 KG/M2 | SYSTOLIC BLOOD PRESSURE: 140 MMHG | HEIGHT: 70 IN | HEART RATE: 91 BPM | WEIGHT: 315 LBS

## 2022-09-14 DIAGNOSIS — E78.5 HYPERLIPIDEMIA LDL GOAL <70: ICD-10-CM

## 2022-09-14 DIAGNOSIS — I25.10 CORONARY ARTERY DISEASE INVOLVING NATIVE CORONARY ARTERY OF NATIVE HEART WITHOUT ANGINA PECTORIS: Primary | ICD-10-CM

## 2022-09-14 DIAGNOSIS — I48.0 PAROXYSMAL ATRIAL FIBRILLATION: ICD-10-CM

## 2022-09-14 DIAGNOSIS — I71.20 THORACIC AORTIC ANEURYSM WITHOUT RUPTURE: ICD-10-CM

## 2022-09-14 DIAGNOSIS — I10 ESSENTIAL HYPERTENSION: ICD-10-CM

## 2022-09-14 PROCEDURE — 99214 OFFICE O/P EST MOD 30 MIN: CPT | Performed by: INTERNAL MEDICINE

## 2022-09-14 RX ORDER — ALLOPURINOL 100 MG/1
100 TABLET ORAL 2 TIMES DAILY
COMMUNITY

## 2022-09-14 NOTE — ASSESSMENT & PLAN NOTE
Continue with Lipitor 80 nightly no myalgias symptoms we will repeat lipids and LFTs to see if at goal of less than 70

## 2022-09-14 NOTE — ASSESSMENT & PLAN NOTE
Patient symptomatically maintaining normal sinus rhythm continue with Eliquis 2.5 twice daily for CVA prevention repeat EKG next visit

## 2022-09-14 NOTE — PROGRESS NOTES
Chief Complaint  Congestive Heart Failure, Hyperlipidemia, Coronary Artery Disease, Atrial Fibrillation, and Hypertension    Subjective    Patient following up today with stable blood pressures at home he states ranging systolic 120s range.  He has not had any significant weight gain chest pain or increase shortness of breath.  There is a very limited functional capacity due to chronic musculoskeletal issues    Past Medical History:   Diagnosis Date   • Acute renal failure superimposed on stage 3b chronic kidney disease (HCC) 07/29/2021   • CAD with CABG 07/28/2021   • Chronic heart failure with preserved ejection fraction 07/29/2021   • Chronic kidney disease    • Diabetes mellitus (HCC)    • Essential hypertension 07/28/2021   • Hyperlipidemia    • Paroxysmal atrial fibrillation 07/28/2021    post op   • Thoracic aortic aneurysm without rupture 07/28/2021    4.5 cm by CT on 6/20         Current Outpatient Medications:   •  allopurinol (ZYLOPRIM) 100 MG tablet, Take 100 mg by mouth 2 (Two) Times a Day., Disp: , Rfl:   •  apixaban (ELIQUIS) 2.5 MG tablet tablet, Take 1 tablet by mouth 2 (Two) Times a Day., Disp: 180 tablet, Rfl: 3  •  aspirin 81 MG chewable tablet, Chew 81 mg Daily., Disp: , Rfl:   •  atorvastatin (LIPITOR) 80 MG tablet, Take 80 mg by mouth every night at bedtime., Disp: , Rfl:   •  B-D UF III MINI PEN NEEDLES 31G X 5 MM misc, USE 1 ONCE DAILY, Disp: , Rfl:   •  carvedilol (COREG) 25 MG tablet, Take 50 mg by mouth 2 (Two) Times a Day., Disp: , Rfl:   •  dilTIAZem CD (CARDIZEM CD) 120 MG 24 hr capsule, Take 1 capsule by mouth Daily., Disp: 90 capsule, Rfl: 3  •  furosemide (LASIX) 20 MG tablet, Take 40 mg by mouth Daily., Disp: , Rfl:   •  glipizide (GLUCOTROL) 10 MG tablet, Take 10 mg by mouth 2 (Two) Times a Day., Disp: , Rfl:   •  lisinopril-hydrochlorothiazide (PRINZIDE,ZESTORETIC) 20-12.5 MG per tablet, Take 2 tablets by mouth Daily., Disp: , Rfl:   •  loratadine (CLARITIN) 10 MG tablet, Take 10  "mg by mouth Daily., Disp: , Rfl:   •  spironolactone (ALDACTONE) 25 MG tablet, Take 25 mg by mouth Daily., Disp: , Rfl:   •  Toujeo SoloStar 300 UNIT/ML solution pen-injector injection, 60 Units., Disp: , Rfl:   •  Trulicity 1.5 MG/0.5ML solution pen-injector, INJECT 0.5ML (1.5MG) SUBCUTANEOUSLY EVERY 7 DAYS IN THE ABDOMEN THIGH OR UPPER ARM ROTATING INJECTION SITES, Disp: , Rfl:     Medications Discontinued During This Encounter   Medication Reason   • amLODIPine (NORVASC) 10 MG tablet *Therapy completed     No Known Allergies     Social History     Tobacco Use   • Smoking status: Never Smoker   • Smokeless tobacco: Never Used   Vaping Use   • Vaping Use: Never used   Substance Use Topics   • Alcohol use: Never   • Drug use: Never       Family History   Problem Relation Age of Onset   • No Known Problems Mother    • Heart attack Father    • Heart failure Father    • Hypertension Father         Objective     /92   Pulse 91   Ht 177.8 cm (70\")   Wt (!) 150 kg (331 lb)   BMI 47.49 kg/m²       Physical Exam    General Appearance:   · no acute distress  · Alert and oriented x3  HENT:   · lips not cyanotic  · Atraumatic  Neck:  · No jvd   · supple  Respiratory:  · no respiratory distress  · Diminished breath sounds bilaterally  · no rales  Cardiovascular:  · Regular rate and rhythm  · no S3, no S4   · no murmur  · no rub  Extremities  · No cyanosis  · lower extremity edema: +1  Skin:   · warm, dry  · No rashes      Result Review :     No results found for: PROBNP                No results found for: POCTROP    Results for orders placed in visit on 08/19/21    Adult Transthoracic Echo Complete W/ Cont if Necessary Per Protocol    Interpretation Summary  · Technically difficult images  · Estimated left ventricular EF = 55% Left ventricular systolic function is normal.                         Diagnoses and all orders for this visit:    1. CAD with CABG (Primary)  Assessment & Plan:  Patient is doing well no " ongoing angina continue with chronic aspirin 81 mg daily        2. Paroxysmal atrial fibrillation  Assessment & Plan:  Patient symptomatically maintaining normal sinus rhythm continue with Eliquis 2.5 twice daily for CVA prevention repeat EKG next visit      3. Essential hypertension  Assessment & Plan:  Mild slight elevation office at home though he states been maintaining in the 120s range gave him a 2-week log to fill out and return.  Continue on his Coreg 25 mg twice daily and spironolactone 25 daily      4. Thoracic aortic aneurysm without rupture  Assessment & Plan:  Stable in size repeat CT in 1 year      5. Hyperlipidemia LDL goal <70  Assessment & Plan:  Continue with Lipitor 80 nightly no myalgias symptoms we will repeat lipids and LFTs to see if at goal of less than 70            Follow Up     Return in about 6 months (around 3/14/2023) for EKG with F/U.          Patient was given instructions and counseling regarding his condition or for health maintenance advice. Please see specific information pulled into the AVS if appropriate.

## 2023-03-03 DIAGNOSIS — I25.10 CORONARY ARTERY DISEASE INVOLVING NATIVE CORONARY ARTERY OF NATIVE HEART WITHOUT ANGINA PECTORIS: ICD-10-CM

## 2023-03-06 ENCOUNTER — TELEPHONE (OUTPATIENT)
Dept: CARDIOLOGY | Facility: CLINIC | Age: 78
End: 2023-03-06
Payer: MEDICARE

## 2023-03-06 NOTE — TELEPHONE ENCOUNTER
----- Message from DAVIE Rosen sent at 3/3/2023 12:44 PM EST -----  Notify pt stable ascending aortic aneurysm 4.5 cm, repeat imgaging in one year

## 2023-04-05 ENCOUNTER — OFFICE VISIT (OUTPATIENT)
Dept: CARDIOLOGY | Facility: CLINIC | Age: 78
End: 2023-04-05
Payer: MEDICARE

## 2023-04-05 VITALS
BODY MASS INDEX: 45.1 KG/M2 | WEIGHT: 315 LBS | SYSTOLIC BLOOD PRESSURE: 111 MMHG | HEIGHT: 70 IN | HEART RATE: 78 BPM | DIASTOLIC BLOOD PRESSURE: 70 MMHG

## 2023-04-05 DIAGNOSIS — I71.21 ANEURYSM OF ASCENDING AORTA WITHOUT RUPTURE: ICD-10-CM

## 2023-04-05 DIAGNOSIS — I50.32 CHRONIC HEART FAILURE WITH PRESERVED EJECTION FRACTION: ICD-10-CM

## 2023-04-05 DIAGNOSIS — E78.5 HYPERLIPIDEMIA LDL GOAL <70: ICD-10-CM

## 2023-04-05 DIAGNOSIS — I25.10 CORONARY ARTERY DISEASE INVOLVING NATIVE CORONARY ARTERY OF NATIVE HEART WITHOUT ANGINA PECTORIS: Primary | ICD-10-CM

## 2023-04-05 DIAGNOSIS — I48.0 PAROXYSMAL ATRIAL FIBRILLATION: ICD-10-CM

## 2023-04-05 DIAGNOSIS — I10 ESSENTIAL HYPERTENSION: ICD-10-CM

## 2023-04-05 PROCEDURE — 3074F SYST BP LT 130 MM HG: CPT | Performed by: INTERNAL MEDICINE

## 2023-04-05 PROCEDURE — 99214 OFFICE O/P EST MOD 30 MIN: CPT | Performed by: INTERNAL MEDICINE

## 2023-04-05 PROCEDURE — 3078F DIAST BP <80 MM HG: CPT | Performed by: INTERNAL MEDICINE

## 2023-04-05 RX ORDER — DILTIAZEM HYDROCHLORIDE 120 MG/1
120 CAPSULE, COATED, EXTENDED RELEASE ORAL DAILY
Qty: 90 CAPSULE | Refills: 3 | Status: SHIPPED | OUTPATIENT
Start: 2023-04-05

## 2023-04-05 NOTE — ASSESSMENT & PLAN NOTE
Continue on Lipitor 80 nightly no myalgias patient was instructed to go get his lipids LFTs today for evaluation if at goal

## 2023-04-05 NOTE — PROGRESS NOTES
Chief Complaint  Follow-up, Atrial Fibrillation, Coronary Artery Disease, Hypertension, and Hyperlipidemia    Subjective    Patient is doing well symptomatically reports stable breathing capacity and no anginal chest discomfort.    Past Medical History:   Diagnosis Date   • Acute renal failure superimposed on stage 3b chronic kidney disease 07/29/2021   • CAD with CABG 07/28/2021   • Chronic heart failure with preserved ejection fraction 07/29/2021   • Chronic kidney disease    • Diabetes mellitus    • Essential hypertension 07/28/2021   • Hyperlipidemia    • Paroxysmal atrial fibrillation 07/28/2021    post op   • Thoracic aortic aneurysm without rupture 07/28/2021    4.5 cm by CT on 6/20         Current Outpatient Medications:   •  allopurinol (ZYLOPRIM) 100 MG tablet, Take 1 tablet by mouth 2 (Two) Times a Day., Disp: , Rfl:   •  apixaban (ELIQUIS) 2.5 MG tablet tablet, Take 1 tablet by mouth 2 (Two) Times a Day., Disp: 180 tablet, Rfl: 3  •  aspirin 81 MG chewable tablet, Chew 1 tablet Daily., Disp: , Rfl:   •  atorvastatin (LIPITOR) 80 MG tablet, Take 1 tablet by mouth every night at bedtime., Disp: , Rfl:   •  B-D UF III MINI PEN NEEDLES 31G X 5 MM misc, USE 1 ONCE DAILY, Disp: , Rfl:   •  carvedilol (COREG) 25 MG tablet, Take 2 tablets by mouth 2 (Two) Times a Day., Disp: , Rfl:   •  dilTIAZem CD (CARDIZEM CD) 120 MG 24 hr capsule, Take 1 capsule by mouth Daily., Disp: 90 capsule, Rfl: 3  •  furosemide (LASIX) 20 MG tablet, Take 2 tablets by mouth Daily., Disp: , Rfl:   •  glipizide (GLUCOTROL) 10 MG tablet, Take 1 tablet by mouth 2 (Two) Times a Day., Disp: , Rfl:   •  lisinopril-hydrochlorothiazide (PRINZIDE,ZESTORETIC) 20-12.5 MG per tablet, Take 2 tablets by mouth Daily., Disp: , Rfl:   •  loratadine (CLARITIN) 10 MG tablet, Take 1 tablet by mouth Daily., Disp: , Rfl:   •  spironolactone (ALDACTONE) 25 MG tablet, Take 1 tablet by mouth Daily., Disp: , Rfl:   •  Toujeo SoloStar 300 UNIT/ML solution  "pen-injector injection, 60 Units., Disp: , Rfl:   •  Trulicity 1.5 MG/0.5ML solution pen-injector, INJECT 0.5ML (1.5MG) SUBCUTANEOUSLY EVERY 7 DAYS IN THE ABDOMEN THIGH OR UPPER ARM ROTATING INJECTION SITES, Disp: , Rfl:     There are no discontinued medications.  No Known Allergies     Social History     Tobacco Use   • Smoking status: Never   • Smokeless tobacco: Never   Vaping Use   • Vaping Use: Never used   Substance Use Topics   • Alcohol use: Never   • Drug use: Never       Family History   Problem Relation Age of Onset   • No Known Problems Mother    • Heart attack Father    • Heart failure Father    • Hypertension Father         Objective     /70   Pulse 78   Ht 177.8 cm (70\")   Wt (!) 150 kg (330 lb)   BMI 47.35 kg/m²       Physical Exam    General Appearance:   · no acute distress  · Alert and oriented x3  HENT:   · lips not cyanotic  · Atraumatic  Neck:  · No jvd   · supple  Respiratory:  · no respiratory distress  · normal breath sounds  · no rales  Cardiovascular:  · Regular rate and rhythm  · no S3, no S4   · no murmur  · no rub  Extremities  · No cyanosis  · lower extremity edema: +1-2  Skin:  n  · warm, dry  · No rashes      Result Review :     No results found for: PROBNP               No results found for: TSH   No results found for: FREET4   No results found for: DDIMERQUANT  No results found for: MG   No results found for: DIGOXIN   No results found for: TROPONINT          No results found for: POCTROP    Results for orders placed in visit on 08/19/21    Adult Transthoracic Echo Complete W/ Cont if Necessary Per Protocol    Interpretation Summary  · Technically difficult images  · Estimated left ventricular EF = 55% Left ventricular systolic function is normal.                 Diagnoses and all orders for this visit:    1. Coronary artery disease involving native coronary artery of native heart without angina pectoris (Primary)  Assessment & Plan:  Patient is doing well no ongoing " angina continue with chronic aspirin 81 mg daily        2. Paroxysmal atrial fibrillation  Assessment & Plan:  Maintain normal sinus rhythm continue Eliquis 5 twice daily for CVA prevention      3. Chronic heart failure with preserved ejection fraction  Assessment & Plan:  Symptomatically stable continue with Lasix 40 twice daily dosing encourage low-sodium diet and daily weight log if possible      4. Essential hypertension  Assessment & Plan:  Control continue with lisinopril HCTZ 40/25, spironolactone 25, and Coreg 50 twice daily      5. Aneurysm of ascending aorta without rupture  Assessment & Plan:  Repeat CT on next visit for serial monitoring      6. Hyperlipidemia LDL goal <70  Assessment & Plan:  Continue on Lipitor 80 nightly no myalgias patient was instructed to go get his lipids LFTs today for evaluation if at goal            Follow Up     Return in about 6 months (around 10/5/2023) for Follow with Yessi Sahu.          Patient was given instructions and counseling regarding his condition or for health maintenance advice. Please see specific information pulled into the AVS if appropriate.

## 2023-04-05 NOTE — ASSESSMENT & PLAN NOTE
Symptomatically stable continue with Lasix 40 twice daily dosing encourage low-sodium diet and daily weight log if possible

## 2023-08-30 ENCOUNTER — TELEPHONE (OUTPATIENT)
Dept: CARDIOLOGY | Facility: CLINIC | Age: 78
End: 2023-08-30
Payer: MEDICARE

## 2023-08-30 DIAGNOSIS — I48.0 PAROXYSMAL ATRIAL FIBRILLATION: ICD-10-CM

## 2023-08-30 NOTE — TELEPHONE ENCOUNTER
Caller: ESME VILLASEÑOR    Relationship: Emergency Contact    Best call back number: [phone]     What orders are you requesting (i.e. lab or imaging): CT DARYL CHEST WO CONTRAST (ALREADY ORDERED)    In what timeframe would the patient need to come in: ASAP    [place] [facility] [place] notes:

## 2023-08-30 NOTE — TELEPHONE ENCOUNTER
Caller: ESME VILLASEÑOR    Relationship: Emergency Contact    Best call back number: [phone]     Requested Prescriptions:   Requested Prescriptions     Pending Prescriptions Disp Refills    apixaban (ELIQUIS) 2.5 MG tablet tablet 180 tablet 3     Sig: Take 1 tablet by mouth 2 (Two) Times a Day.        Pharmacy where request should be sent:    [facility]   Last office visit with prescribing clinician: 4/5/2023   Last telemedicine visit with prescribing clinician: Visit date not found   Next office visit with prescribing clinician: Visit date not found     Additional details provided by patient:     Does the patient have less than a 3 day supply:  [x] Yes  [] No    Would you like a call back once the refill request has been completed: [x] Yes [] No    If the office needs to give you a call back, can they leave a voicemail: [x] Yes [] No    Orion Higgins Rep   08/30/23 14:16 EDT

## 2023-09-22 ENCOUNTER — TELEPHONE (OUTPATIENT)
Dept: CARDIOLOGY | Facility: CLINIC | Age: 78
End: 2023-09-22
Payer: MEDICARE

## 2023-09-22 DIAGNOSIS — E78.5 HYPERLIPIDEMIA LDL GOAL <70: Primary | ICD-10-CM

## 2023-09-22 RX ORDER — EZETIMIBE 10 MG/1
10 TABLET ORAL DAILY
Qty: 90 TABLET | Refills: 3 | Status: SHIPPED | OUTPATIENT
Start: 2023-09-22

## 2023-09-22 NOTE — TELEPHONE ENCOUNTER
----- Message from DAVIE Rosen sent at 9/22/2023 12:20 PM EDT -----  LDL is not to goal of less than 70, recommend adding zetia 10 mg daily and repeat fasting lipid and hepatic function panel in 3 months  ----- Message -----  From: Gaurang, Scans Incoming  Sent: 9/22/2023  12:18 PM EDT  To: Mundo Mandujano MD

## 2023-10-05 ENCOUNTER — OFFICE VISIT (OUTPATIENT)
Dept: CARDIOLOGY | Facility: CLINIC | Age: 78
End: 2023-10-05
Payer: MEDICARE

## 2023-10-05 VITALS
WEIGHT: 315 LBS | DIASTOLIC BLOOD PRESSURE: 86 MMHG | HEIGHT: 70 IN | HEART RATE: 78 BPM | SYSTOLIC BLOOD PRESSURE: 130 MMHG | BODY MASS INDEX: 45.1 KG/M2

## 2023-10-05 DIAGNOSIS — I50.32 CHRONIC HEART FAILURE WITH PRESERVED EJECTION FRACTION: ICD-10-CM

## 2023-10-05 DIAGNOSIS — E78.5 HYPERLIPIDEMIA LDL GOAL <70: ICD-10-CM

## 2023-10-05 DIAGNOSIS — I71.21 ANEURYSM OF ASCENDING AORTA WITHOUT RUPTURE: ICD-10-CM

## 2023-10-05 DIAGNOSIS — I25.10 CORONARY ARTERY DISEASE INVOLVING NATIVE CORONARY ARTERY OF NATIVE HEART WITHOUT ANGINA PECTORIS: Primary | ICD-10-CM

## 2023-10-05 DIAGNOSIS — I48.0 PAROXYSMAL ATRIAL FIBRILLATION: ICD-10-CM

## 2023-10-05 DIAGNOSIS — I10 ESSENTIAL HYPERTENSION: ICD-10-CM

## 2023-10-05 PROBLEM — I48.91 ATRIAL FIBRILLATION: Status: ACTIVE | Noted: 2023-10-05

## 2023-10-05 PROBLEM — N17.9 ACUTE RENAL FAILURE SUPERIMPOSED ON STAGE 3B CHRONIC KIDNEY DISEASE: Status: RESOLVED | Noted: 2021-07-29 | Resolved: 2023-10-05

## 2023-10-05 PROBLEM — E87.5 HYPERKALEMIA: Status: ACTIVE | Noted: 2023-10-05

## 2023-10-05 PROBLEM — I48.91 ATRIAL FIBRILLATION: Status: RESOLVED | Noted: 2023-10-05 | Resolved: 2023-10-05

## 2023-10-05 PROBLEM — N18.32 ACUTE RENAL FAILURE SUPERIMPOSED ON STAGE 3B CHRONIC KIDNEY DISEASE: Status: RESOLVED | Noted: 2021-07-29 | Resolved: 2023-10-05

## 2023-10-05 PROBLEM — R00.9 ABNORMALITY OF HEART BEAT: Status: ACTIVE | Noted: 2023-10-05

## 2023-10-05 PROBLEM — R60.9 EDEMA: Status: ACTIVE | Noted: 2023-10-05

## 2023-10-05 PROBLEM — R60.9 EDEMA: Status: RESOLVED | Noted: 2023-10-05 | Resolved: 2023-10-05

## 2023-10-05 PROBLEM — E11.21 DIABETIC NEPHROPATHY ASSOCIATED WITH TYPE 2 DIABETES MELLITUS: Status: ACTIVE | Noted: 2023-10-05

## 2023-10-05 PROBLEM — E87.5 HYPERKALEMIA: Status: RESOLVED | Noted: 2023-10-05 | Resolved: 2023-10-05

## 2023-10-05 PROBLEM — N18.32 STAGE 3B CHRONIC KIDNEY DISEASE: Status: ACTIVE | Noted: 2023-10-05

## 2023-10-05 PROBLEM — R00.9 ABNORMALITY OF HEART BEAT: Status: RESOLVED | Noted: 2023-10-05 | Resolved: 2023-10-05

## 2023-10-05 RX ORDER — DILTIAZEM HYDROCHLORIDE 120 MG/1
120 CAPSULE, EXTENDED RELEASE ORAL DAILY
COMMUNITY
Start: 2023-08-16

## 2023-10-05 NOTE — PROGRESS NOTES
Chief Complaint  Follow-up (6 month f/u ), Atrial Fibrillation, Hypertension, and Coronary Artery Disease    Subjective            History of Present Illness  Amador Santana is a 78-year-old white/ male patient who presents to the office today for follow-up.  He has CAD with prior CABG, chronic HFpEF, paroxysmal atrial fibrillation, hypertension, hyperlipidemia, and ascending aortic aneurysm.  He is compliant with medications.  He reports bilateral lower extremity edema. He denies any chest pain, shortness of breath, lightheadedness/dizziness, or palpitations.    PMH  Past Medical History:   Diagnosis Date    Acute renal failure superimposed on stage 3b chronic kidney disease 07/29/2021    CAD with CABG 07/28/2021    Chronic heart failure with preserved ejection fraction 07/29/2021    Chronic kidney disease     Diabetes mellitus     Essential hypertension 07/28/2021    Hyperlipidemia     Paroxysmal atrial fibrillation 07/28/2021    post op    Thoracic aortic aneurysm without rupture 07/28/2021    4.5 cm by CT on 6/20         ALLERGY  No Known Allergies       SURGICALHX  Past Surgical History:   Procedure Laterality Date    CORONARY ARTERY BYPASS GRAFT      HERNIA REPAIR            SOC  Social History     Socioeconomic History    Marital status:    Tobacco Use    Smoking status: Never    Smokeless tobacco: Never   Vaping Use    Vaping Use: Never used   Substance and Sexual Activity    Alcohol use: Never    Drug use: Never    Sexual activity: Defer         FAMHX  Family History   Problem Relation Age of Onset    No Known Problems Mother     Heart attack Father     Heart failure Father     Hypertension Father           MEDSIGONLY  Current Outpatient Medications on File Prior to Visit   Medication Sig    allopurinol (ZYLOPRIM) 100 MG tablet Take 1 tablet by mouth Daily.    apixaban (ELIQUIS) 2.5 MG tablet tablet Take 1 tablet by mouth 2 (Two) Times a Day.    aspirin 81 MG chewable tablet Chew 1 tablet  "Daily.    atorvastatin (LIPITOR) 80 MG tablet Take 1 tablet by mouth every night at bedtime.    B-D UF III MINI PEN NEEDLES 31G X 5 MM misc USE 1 ONCE DAILY    carvedilol (COREG) 25 MG tablet Take 2 tablets by mouth 2 (Two) Times a Day.    dilTIAZem CD (CARDIZEM CD) 120 MG 24 hr capsule Take 1 capsule by mouth Daily.    dilTIAZem XR (DILACOR XR) 120 MG 24 hr capsule Take 1 capsule by mouth Daily.    ezetimibe (ZETIA) 10 MG tablet Take 1 tablet by mouth Daily.    furosemide (LASIX) 20 MG tablet Take 2 tablets by mouth Daily.    glipizide (GLUCOTROL) 10 MG tablet Take 1 tablet by mouth 2 (Two) Times a Day.    lisinopril-hydrochlorothiazide (PRINZIDE,ZESTORETIC) 20-12.5 MG per tablet Take 2 tablets by mouth Daily.    loratadine (CLARITIN) 10 MG tablet Take 1 tablet by mouth Daily.    mupirocin (BACTROBAN) 2 % ointment Apply 1 application  topically to the appropriate area as directed 2 (Two) Times a Day.    spironolactone (ALDACTONE) 25 MG tablet Take 1 tablet by mouth Daily.    Toujeo SoloStar 300 UNIT/ML solution pen-injector injection 60 Units.    Trulicity 1.5 MG/0.5ML solution pen-injector INJECT 0.5ML (1.5MG) SUBCUTANEOUSLY EVERY 7 DAYS IN THE ABDOMEN THIGH OR UPPER ARM ROTATING INJECTION SITES     No current facility-administered medications on file prior to visit.       Objective   /86   Pulse 78   Ht 177.8 cm (70\")   Wt (!) 156 kg (343 lb)   BMI 49.22 kg/m²       Physical Exam  Constitutional:       Appearance: He is obese.   HENT:      Head: Normocephalic.   Neck:      Vascular: No carotid bruit.   Cardiovascular:      Rate and Rhythm: Normal rate and regular rhythm.      Pulses: Normal pulses.      Heart sounds: Normal heart sounds. No murmur heard.  Pulmonary:      Effort: Pulmonary effort is normal.      Breath sounds: Normal breath sounds.   Musculoskeletal:      Cervical back: Neck supple.      Right lower le+ Pitting Edema present.      Left lower le+ Pitting Edema present.   Skin:     " General: Skin is dry.   Neurological:      Mental Status: He is alert and oriented to person, place, and time.   Psychiatric:         Behavior: Behavior normal.       Result Review :   The following data was reviewed by: DAVIE Silverio on 10/05/2023:  No results found for: PROBNP     No results found for: TSH   No results found for: FREET4   No results found for: DDIMERQUANT  No results found for: MG   No results found for: DIGOXIN   No results found for: TROPONINT          Results for orders placed in visit on 08/19/21    Adult Transthoracic Echo Complete W/ Cont if Necessary Per Protocol    Interpretation Summary  · Technically difficult images  · Estimated left ventricular EF = 55% Left ventricular systolic function is normal.      [id number] Score: 6        Assessment and Plan    Diagnoses and all orders for this visit:    1. CAD with CABG (Primary)  He denies any anginal symptoms, continue aspirin 81 mg daily.    2. Chronic heart failure with preserved ejection fraction  He has symptoms of bilateral lower extremity edema on exam today, we talked about reducing fluid and sodium intake, ACE bandage wrap for compression therapy, and daily weight.  Continue Lasix 40 mg daily and spironolactone 25 mg daily.  Obtain echocardiogram to assess left ventricular systolic function.  -     Adult Transthoracic Echo Complete W/ Cont if Necessary Per Protocol; Future    3. Paroxysmal atrial fibrillation  Symptomatically stable at this time, rate controlled, continue carvedilol 50 mg twice daily and diltiazem 120 mg daily.  Continue Eliquis for CVA prevention.    4. Essential hypertension  Currently controlled without adverse effects from medication, continue carvedilol 50 mg twice daily, diltiazem 120 mg daily, lisinopril-HCTZ 20-12.5 mg 2 tablets daily, and spironolactone 25 mg daily.    5. Hyperlipidemia LDL goal <70  Last lipid panel was 9/22/2023 with LDL 74 which is slightly above goal range, continue  atorvastatin 80 mg nightly and ezetimibe 10 mg daily.  Repeat fasting lipid and hepatic function panel prior to next office visit and if LDL remains above 70 then would recommend adding Repatha/Praluent/Leqvio.    6. Aneurysm of ascending aorta without rupture  Last CT chest was 9/14/2023 which shows thoracic aorta normal in caliber.  Continue to monitor with repeat CT chest in 1 year.  -     CT Chest Without Contrast; Future            Follow Up   Return in about 6 months (around 4/5/2024) for Follow up with Dr Mandujano.    Patient was given instructions and counseling regarding his condition or for health maintenance advice. Please see specific information pulled into the AVS if appropriate.     Amador Santana  reports that he has never smoked. He has never used smokeless tobacco.           DAVIE Silverio  10/05/23  09:01 EDT    Dictated Utilizing Dragon Dictation